# Patient Record
Sex: MALE | URBAN - METROPOLITAN AREA
[De-identification: names, ages, dates, MRNs, and addresses within clinical notes are randomized per-mention and may not be internally consistent; named-entity substitution may affect disease eponyms.]

---

## 2023-11-01 ENCOUNTER — APPOINTMENT (OUTPATIENT)
Dept: URBAN - METROPOLITAN AREA CLINIC 244 | Age: 33
Setting detail: DERMATOLOGY
End: 2023-11-01

## 2023-11-01 DIAGNOSIS — L30.8 OTHER SPECIFIED DERMATITIS: ICD-10-CM

## 2023-11-01 PROCEDURE — OTHER COUNSELING: OTHER

## 2023-11-01 PROCEDURE — 99203 OFFICE O/P NEW LOW 30 MIN: CPT

## 2023-11-01 PROCEDURE — OTHER PRESCRIPTION: OTHER

## 2023-11-01 RX ORDER — CLOBETASOL PROPIONATE 0.5 MG/G
CREAM TOPICAL BID
Qty: 60 | Refills: 1 | Status: ERX | COMMUNITY
Start: 2023-11-01

## 2023-11-01 ASSESSMENT — LOCATION DETAILED DESCRIPTION DERM: LOCATION DETAILED: LEFT DORSAL THUMB METACARPOPHALANGEAL JOINT

## 2023-11-01 ASSESSMENT — LOCATION SIMPLE DESCRIPTION DERM: LOCATION SIMPLE: LEFT HAND

## 2023-11-01 ASSESSMENT — LOCATION ZONE DERM: LOCATION ZONE: HAND

## 2024-07-12 ENCOUNTER — LAB ENCOUNTER (OUTPATIENT)
Dept: LAB | Facility: HOSPITAL | Age: 34
End: 2024-07-12
Attending: INTERNAL MEDICINE
Payer: COMMERCIAL

## 2024-07-12 ENCOUNTER — HOSPITAL ENCOUNTER (OUTPATIENT)
Dept: GENERAL RADIOLOGY | Facility: HOSPITAL | Age: 34
Discharge: HOME | End: 2024-07-12
Attending: INTERNAL MEDICINE
Payer: COMMERCIAL

## 2024-07-12 ENCOUNTER — OFFICE VISIT (OUTPATIENT)
Dept: RHEUMATOLOGY | Facility: CLINIC | Age: 34
End: 2024-07-12

## 2024-07-12 VITALS
BODY MASS INDEX: 25.11 KG/M2 | SYSTOLIC BLOOD PRESSURE: 105 MMHG | HEIGHT: 67 IN | HEART RATE: 76 BPM | WEIGHT: 160 LBS | DIASTOLIC BLOOD PRESSURE: 65 MMHG

## 2024-07-12 DIAGNOSIS — M25.50 POLYARTHRALGIA: Primary | ICD-10-CM

## 2024-07-12 DIAGNOSIS — M25.50 POLYARTHRALGIA: ICD-10-CM

## 2024-07-12 LAB
ALT SERPL-CCNC: 23 U/L
AST SERPL-CCNC: 23 U/L (ref ?–34)
C3 SERPL-MCNC: 135.5 MG/DL (ref 84–160)
C4 SERPL-MCNC: 29.4 MG/DL (ref 12–36)
CREAT BLD-MCNC: 0.84 MG/DL
EGFRCR SERPLBLD CKD-EPI 2021: 118 ML/MIN/1.73M2 (ref 60–?)
RHEUMATOID FACT SERPL-ACNC: 4 IU/ML (ref ?–14)

## 2024-07-12 PROCEDURE — 86431 RHEUMATOID FACTOR QUANT: CPT | Performed by: INTERNAL MEDICINE

## 2024-07-12 PROCEDURE — 86160 COMPLEMENT ANTIGEN: CPT

## 2024-07-12 PROCEDURE — 3074F SYST BP LT 130 MM HG: CPT | Performed by: INTERNAL MEDICINE

## 2024-07-12 PROCEDURE — 86200 CCP ANTIBODY: CPT | Performed by: INTERNAL MEDICINE

## 2024-07-12 PROCEDURE — 3078F DIAST BP <80 MM HG: CPT | Performed by: INTERNAL MEDICINE

## 2024-07-12 PROCEDURE — 85025 COMPLETE CBC W/AUTO DIFF WBC: CPT

## 2024-07-12 PROCEDURE — 73562 X-RAY EXAM OF KNEE 3: CPT | Performed by: INTERNAL MEDICINE

## 2024-07-12 PROCEDURE — 99204 OFFICE O/P NEW MOD 45 MIN: CPT | Performed by: INTERNAL MEDICINE

## 2024-07-12 PROCEDURE — 20610 DRAIN/INJ JOINT/BURSA W/O US: CPT | Performed by: INTERNAL MEDICINE

## 2024-07-12 PROCEDURE — 86038 ANTINUCLEAR ANTIBODIES: CPT | Performed by: INTERNAL MEDICINE

## 2024-07-12 PROCEDURE — 84460 ALANINE AMINO (ALT) (SGPT): CPT

## 2024-07-12 PROCEDURE — 85060 BLOOD SMEAR INTERPRETATION: CPT

## 2024-07-12 PROCEDURE — 36415 COLL VENOUS BLD VENIPUNCTURE: CPT

## 2024-07-12 PROCEDURE — 82565 ASSAY OF CREATININE: CPT

## 2024-07-12 PROCEDURE — 84450 TRANSFERASE (AST) (SGOT): CPT

## 2024-07-12 PROCEDURE — 3008F BODY MASS INDEX DOCD: CPT | Performed by: INTERNAL MEDICINE

## 2024-07-12 RX ORDER — TRIAMCINOLONE ACETONIDE 40 MG/ML
40 INJECTION, SUSPENSION INTRA-ARTICULAR; INTRAMUSCULAR ONCE
Status: COMPLETED | OUTPATIENT
Start: 2024-07-12 | End: 2024-07-12

## 2024-07-12 NOTE — PROGRESS NOTES
RHEUMATOLOGY CLINIC- NEW PATIENT    Kalin Nevarez is a 33 year old male.    ASSESSMENT/PLAN:       ICD-10-CM    1. Polyarthralgia  M25.50 triamcinolone acetonide (Kenalog-40) 40 MG/ML injection 40 mg     DRAIN/INJECT LARGE JOINT/BURSA     ALT(SGPT)     AST (SGOT)     CBC With Differential With Platelet     Creatinine, Serum     Cyclic Citrullinate Pep. IGG     Rheumatoid Arthritis Factor     Anti-Nuclear Antibody (GENIA) by IFA, Reflex Titer + Specific Antibodies     Complement C3, Serum     Complement C4, Serum     XR KNEE (3 VIEWS), LEFT (CPT=73562)     XR KNEE (3 VIEWS), RIGHT (CPT=73562)          DISCUSSION:  Patient presents as a new outpatient for evaluation of arthralgias that I suspect is more mechanical in etiology.  No synovitis on current exam.  Per patient request though, autoimmune workup ordered and I will MyChart patient regarding these results.  Will also check bilateral knee x-rays for independent review.  Right knee CSI performed in office today without complication and postprocedural instructions discussed.  Patient to contact office if he is interested in gel injections to the knee.  Lastly, patient requesting work accommodation letter which was printed for the patient today.    PLAN:  -Right knee CSI performed in office today without complication (procedure note below)  - Okay to continue as needed Tylenol for breakthrough pain  - Bilateral knee x-rays for independent review  - Patient to obtain the above nonfasting lab work  - Consult/evaluation communicated with referring physician/provider.    I will MyChart patient on receipt of above results.  Otherwise, patient to contact office and interested in gel knee injections.    Fatoumata Cantu DO  7/12/2024   2:34 PM  There is a longitudinal care relationship with me, the care plan reflects the ongoing nature of the continuous relationship of care, and the medical record indicates that there is ongoing treatment of a serious/complex medical condition  which I am currently managing.  is Applicable.         HPI:     Chief Complaint   Patient presents with    Consult     Knee pain, swelling in fingers, feels feels burning sensation in feet       I had the pleasure of seeing Kalin Nevarez on 7/12/2024 as a new outpatient consultation for polyarthralgias.     33 year old male w/ PMH vitamin D deficiency who presents to clinic today.  Patient reports a few months of polyarthralgias predominantly affecting his weightbearing joints including knees, feet, ankles.  Symptoms are worse with activity and after a busy day.  He has tried to treat his symptoms with Tylenol PM extra strength with only minimal relief of symptoms.  Also has occasional hand pain and stiffness.  Family history notable for mother with cutaneous psoriasis.  ROS otherwise negative for measured fevers, chills, unintentional weight loss, photosensitive rash, history of serositis, Raynaud's phenomenon.    Current Medications:  PRN Tylenol PM    Medication History:  N/A     Interval History:   See Above    HISTORY:  No past medical history on file.   Social Hx Reviewed   Family Hx Reviewed     Medications (Active prior to today's visit):  No current outpatient medications on file.       Allergies:  No Known Allergies      ROS:   Review of Systems   Constitutional:  Negative for chills and fever.   HENT:  Negative for congestion, hearing loss, mouth sores, nosebleeds and trouble swallowing.    Eyes:  Negative for photophobia, pain, redness and visual disturbance.   Respiratory:  Negative for cough and shortness of breath.    Cardiovascular:  Negative for chest pain, palpitations and leg swelling.   Gastrointestinal:  Negative for abdominal pain, blood in stool, diarrhea and nausea.   Endocrine: Negative for cold intolerance and heat intolerance.   Genitourinary:  Negative for dysuria, frequency and hematuria.   Musculoskeletal:  Positive for arthralgias, gait problem and joint swelling. Negative for  back pain, myalgias, neck pain and neck stiffness.   Skin:  Negative for color change and rash.   Neurological:  Negative for dizziness, weakness, numbness and headaches.   Psychiatric/Behavioral:  Negative for confusion and dysphoric mood.         PHYSICAL EXAM:     Constitutional:  Well developed, Well nourished, No acute distress  HENT:  Normocephalic, Atraumatic, Bilateral external ears normal, Oropharynx moist, No oral exudates.  Neck: Normal range of motion, No tenderness, Supple, No stridor.  Eyes:  PERRL, EOMI, Conjunctiva normal, No discharge.  Respiratory:  Normal breath sounds, No respiratory distress, No wheezing.  Cardiovascular:  Normal heart rate, Normal rhythm, No murmurs, No rubs, No gallops.  GI:  Bowel sounds normal, Soft, No tenderness, No masses, No pulsatile masses.  : No CVA tenderness.  Musculoskeletal:  A comprehensive 28 count joint exam was done and was negative for swelling or tenderness except as noted. Inspections for misalignment, asymmetry, crepitation, defects, tenderness, masses, nodules, effusions, range of motion, and stability in the upper and lower extremities bilaterally are all normal unless noted.           Integument:  Warm, Dry, No erythema, No rash.  Lymphatic:  No lymphadenopathy noted.  Neurologic:  Alert & oriented x 3, Normal motor function, Normal sensory function, No focal deficits noted.  Psychiatric:  Affect normal, Judgment normal, Mood normal.    LABS:     N/A    Imaging:     N/A

## 2024-07-13 LAB
BASOPHILS # BLD AUTO: 0.08 X10(3) UL (ref 0–0.2)
BASOPHILS NFR BLD AUTO: 0.7 %
DEPRECATED RDW RBC AUTO: 39.2 FL (ref 35.1–46.3)
EOSINOPHIL # BLD AUTO: 0.51 X10(3) UL (ref 0–0.7)
EOSINOPHIL NFR BLD AUTO: 4.7 %
ERYTHROCYTE [DISTWIDTH] IN BLOOD BY AUTOMATED COUNT: 14 % (ref 11–15)
HCT VFR BLD AUTO: 49.6 %
HGB BLD-MCNC: 15.8 G/DL
IMM GRANULOCYTES # BLD AUTO: 0.04 X10(3) UL (ref 0–1)
IMM GRANULOCYTES NFR BLD: 0.4 %
LYMPHOCYTES # BLD AUTO: 3.85 X10(3) UL (ref 1–4)
LYMPHOCYTES NFR BLD AUTO: 35.4 %
MCH RBC QN AUTO: 25 PG (ref 26–34)
MCHC RBC AUTO-ENTMCNC: 31.9 G/DL (ref 31–37)
MCV RBC AUTO: 78.5 FL
MONOCYTES # BLD AUTO: 0.73 X10(3) UL (ref 0.1–1)
MONOCYTES NFR BLD AUTO: 6.7 %
NEUTROPHILS # BLD AUTO: 5.66 X10 (3) UL (ref 1.5–7.7)
NEUTROPHILS # BLD AUTO: 5.66 X10(3) UL (ref 1.5–7.7)
NEUTROPHILS NFR BLD AUTO: 52.1 %
PLATELET # BLD AUTO: 358 10(3)UL (ref 150–450)
RBC # BLD AUTO: 6.32 X10(6)UL
WBC # BLD AUTO: 10.9 X10(3) UL (ref 4–11)

## 2024-07-16 ENCOUNTER — TELEPHONE (OUTPATIENT)
Dept: RHEUMATOLOGY | Facility: CLINIC | Age: 34
End: 2024-07-16

## 2024-07-16 LAB — NUCLEAR IGG TITR SER IF: NEGATIVE {TITER}

## 2024-07-16 NOTE — TELEPHONE ENCOUNTER
Patient is requesting another not for his job. His job thinks the note is fake. Patient has certain thing he would like note have. He would like to speak to someone regarding his request. Patient states \"this is an emergency\". Patient will be filing a complaint with his supervisor.Please advise.

## 2024-07-16 NOTE — TELEPHONE ENCOUNTER
Called patient 7/16 during AM.  Patient requesting further elaboration in his work letter.  Also requesting air conditioning a vehicle.

## 2024-07-18 LAB — CCP IGG SERPL-ACNC: 0.7 U/ML (ref 0–6.9)

## 2025-04-15 ENCOUNTER — TELEPHONE (OUTPATIENT)
Age: 35
End: 2025-04-15

## 2025-04-15 NOTE — TELEPHONE ENCOUNTER
Briana message sent:  Hello-I received a message regarding your joint pain.  I see that you are scheduled to see me 4/29 and we can take a closer look then.  If you like, we could trial a temporary steroid pack to see if this helps your symptoms.  Would keep in mind, steroids can cause: Difficulty sleeping (would take it in the morning), sugar problems, weight gain, fluid retention.  Please let me know and I can send it over to your listed pharmacy.  Thank you.

## 2025-04-15 NOTE — TELEPHONE ENCOUNTER
Patient, states that he has stiffness and inflammation in his fingers and palms. Patient stated that he also has pain on the right foot. Per the patient he took ibuprofen last night, but, still work up with pain.

## 2025-04-15 NOTE — TELEPHONE ENCOUNTER
Name and  verified. Pt reported swelling/pain in middle and index finger and swelling in palms. Right foot pain and swelling in 5th toe. He took over the counter ibuprofen 3 tablets last night, it did not help with the pain. Pt advised to schedule appointment for further evaluation. Pt agreeable and scheduled. Pt would like to know what to do for pain until appointment. Please advise.       Future Appointments   Date Time Provider Department Center   2025 11:40 AM Lehne, Ramier, DO [facility] [place]

## 2025-04-15 NOTE — TELEPHONE ENCOUNTER
If you have chest pain, increased lightheadedness, shortness of breath, increased abdominal pain   Noted.

## 2025-04-17 RX ORDER — PREDNISONE 5 MG/1
TABLET ORAL
Qty: 30 TABLET | Refills: 0 | Status: SHIPPED | OUTPATIENT
Start: 2025-04-17 | End: 2025-04-29

## 2025-04-29 ENCOUNTER — OFFICE VISIT (OUTPATIENT)
Dept: RHEUMATOLOGY | Facility: CLINIC | Age: 35
End: 2025-04-29

## 2025-04-29 VITALS
HEART RATE: 71 BPM | BODY MASS INDEX: 24.8 KG/M2 | SYSTOLIC BLOOD PRESSURE: 104 MMHG | WEIGHT: 158 LBS | DIASTOLIC BLOOD PRESSURE: 60 MMHG | HEIGHT: 67 IN

## 2025-04-29 DIAGNOSIS — M25.50 POLYARTHRALGIA: Primary | ICD-10-CM

## 2025-04-29 DIAGNOSIS — M17.0 OSTEOARTHRITIS OF BOTH KNEES, UNSPECIFIED OSTEOARTHRITIS TYPE: ICD-10-CM

## 2025-04-29 PROCEDURE — 3074F SYST BP LT 130 MM HG: CPT | Performed by: INTERNAL MEDICINE

## 2025-04-29 PROCEDURE — 3008F BODY MASS INDEX DOCD: CPT | Performed by: INTERNAL MEDICINE

## 2025-04-29 PROCEDURE — G2211 COMPLEX E/M VISIT ADD ON: HCPCS | Performed by: INTERNAL MEDICINE

## 2025-04-29 PROCEDURE — 99214 OFFICE O/P EST MOD 30 MIN: CPT | Performed by: INTERNAL MEDICINE

## 2025-04-29 PROCEDURE — 3078F DIAST BP <80 MM HG: CPT | Performed by: INTERNAL MEDICINE

## 2025-04-29 NOTE — PROGRESS NOTES
RHEUMATOLOGY CLINIC- FOLLOW UP    Kalin Nevarez is a 34 year old male.    ASSESSMENT/PLAN:       ICD-10-CM    1. Polyarthralgia  M25.50 XR HAND (MIN 3 VIEWS), LEFT (CPT=73130)     XR HAND (MIN 3 VIEWS), RIGHT (CPT=73130)     XR FOOT (2 VIEW), LEFT (CPT=73620)     XR FOOT (2 VIEW), RIGHT (CPT=73620)      2. Osteoarthritis of both knees, unspecified osteoarthritis type  M17.0 XR HAND (MIN 3 VIEWS), LEFT (CPT=73130)     XR HAND (MIN 3 VIEWS), RIGHT (CPT=73130)     XR FOOT (2 VIEW), LEFT (CPT=73620)     XR FOOT (2 VIEW), RIGHT (CPT=73620)          DISCUSSION:  Patient presents for f/u of polyarthralgias that I suspect is more mechanical in etiology.  No synovitis on current exam and prior imaging notable for bilateral knee OA which responded well to a CSI at his last appointment.  For now, discussed conservative management, as below.  Will check additional x-rays given his other arthralgias for independent review.    PLAN:  -- For pain relief, patient can take Tylenol-arthritis strength at 2 tablets every 8 hours as needed. May also apply topical Voltaren (diclofenac gel) to their affected areas of pain up to 4 times daily  - Will check x-rays for independent review  I will MyChart patient on receipt of above results.  Otherwise, patient to contact office if interested in repeat CSI versus gel injection    Fatoumata Cantu DO  4/29/2025   12:19 PM    There is a longitudinal care relationship with me, the care plan reflects the ongoing nature of the continuous relationship of care, and the medical record indicates that there is ongoing treatment of a serious/complex medical condition which I am currently managing.  is Applicable.         SUBJECTIVE:     4/29/25 Follow-Up: Responded well to right knee CSI performed at last appointment July 2025.  But has been working more recently and notes his joints have been acting up including his hands, feet especially.  Hands can be aggravated by activity like trying to lift  things or grab things.  Symptoms can be better with rest.  He started the prednisone taper which helped somewhat but still has 3 days left.    Current Medications:  PRN Tylenol PM    Medication History:  R Knee CSI 7/12/25    Prednisone taper x 12 days-minimal improvement     Interval History:     7/12/25 Initial Consult:  had the pleasure of seeing Kalin Nevarez on 7/12/2024 as a new outpatient consultation for polyarthralgias.     34 year old male w/ PMH vitamin D deficiency who presents to clinic today.  Patient reports a few months of polyarthralgias predominantly affecting his weightbearing joints including knees, feet, ankles.  Symptoms are worse with activity and after a busy day.  He has tried to treat his symptoms with Tylenol PM extra strength with only minimal relief of symptoms.  Also has occasional hand pain and stiffness.  Family history notable for mother with cutaneous psoriasis.  ROS otherwise negative for measured fevers, chills, unintentional weight loss, photosensitive rash, history of serositis, Raynaud's phenomenon.  HISTORY:  No past medical history on file.   Social Hx Reviewed   Family Hx Reviewed     Medications (Active prior to today's visit):  Current Outpatient Medications   Medication Sig Dispense Refill    predniSONE 5 MG Oral Tab Take 4 tablets (20 mg total) by mouth daily for 3 days, THEN 3 tablets (15 mg total) daily for 3 days, THEN 2 tablets (10 mg total) daily for 3 days, THEN 1 tablet (5 mg total) daily for 3 days. Take 4 tablets (20 mg total) by mouth every morning for 3 days, THEN 3 tablets (15 mg total) every morning for 3 days, THEN 2 tablets (10 mg total) every morning for 3 days. THEN 1 tablet (5 mg total) every morning for 3 days. 30 tablet 0       Allergies:  No Known Allergies      ROS:   Review of Systems   Constitutional:  Negative for chills and fever.   HENT:  Negative for congestion, hearing loss, mouth sores, nosebleeds and trouble swallowing.    Eyes:  Negative  for photophobia, pain, redness and visual disturbance.   Respiratory:  Negative for cough and shortness of breath.    Cardiovascular:  Negative for chest pain, palpitations and leg swelling.   Gastrointestinal:  Negative for abdominal pain, blood in stool, diarrhea and nausea.   Endocrine: Negative for cold intolerance and heat intolerance.   Genitourinary:  Negative for dysuria, frequency and hematuria.   Musculoskeletal:  Positive for arthralgias, gait problem and joint swelling. Negative for back pain, myalgias, neck pain and neck stiffness.   Skin:  Negative for color change and rash.   Neurological:  Negative for dizziness, weakness, numbness and headaches.   Psychiatric/Behavioral:  Negative for confusion and dysphoric mood.         PHYSICAL EXAM:     Constitutional:  Well developed, Well nourished, No acute distress  HENT:  Normocephalic, Atraumatic, Bilateral external ears normal, Oropharynx moist, No oral exudates.  Neck: Normal range of motion, No tenderness, Supple, No stridor.  Eyes:  PERRL, EOMI, Conjunctiva normal, No discharge.  Respiratory:  Normal breath sounds, No respiratory distress, No wheezing.  Cardiovascular:  Normal heart rate, Normal rhythm, No murmurs, No rubs, No gallops.  GI:  Bowel sounds normal, Soft, No tenderness, No masses, No pulsatile masses.  : No CVA tenderness.  Musculoskeletal:  A comprehensive 28 count joint exam was done and was negative for swelling or tenderness except as noted. Inspections for misalignment, asymmetry, crepitation, defects, tenderness, masses, nodules, effusions, range of motion, and stability in the upper and lower extremities bilaterally are all normal unless noted.           Integument:  Warm, Dry, No erythema, No rash.  Lymphatic:  No lymphadenopathy noted.  Neurologic:  Alert & oriented x 3, Normal motor function, Normal sensory function, No focal deficits noted.  Psychiatric:  Affect normal, Judgment normal, Mood normal.    LABS:     7/12/2024:  C3  135.5 WNL, C4 29.4 WNL  CR 0.84 WNL, AST 23 WNL, ALT 23 WNL  CBC with normal WBC, Hg 15.8 microcytic,  WNL    GENIA IFA negative  RF 4 WNL, CCP 0.7 WNL     Imagin/12/24 Bilateral Knee XR:        L Impression   CONCLUSION:     No acute fracture or dislocation.     Minimal tricompartmental osteoarthrosis.     Trace suprapatellar joint effusion.       R Impression   CONCLUSION:     No acute fracture or dislocation.     Minimal tricompartmental osteoarthrosis     Trace suprapatellar joint effusion.

## 2025-05-01 ENCOUNTER — HOSPITAL ENCOUNTER (OUTPATIENT)
Dept: GENERAL RADIOLOGY | Age: 35
Discharge: HOME | End: 2025-05-01
Attending: INTERNAL MEDICINE
Payer: COMMERCIAL

## 2025-05-01 DIAGNOSIS — M17.0 OSTEOARTHRITIS OF BOTH KNEES, UNSPECIFIED OSTEOARTHRITIS TYPE: ICD-10-CM

## 2025-05-01 DIAGNOSIS — M25.50 POLYARTHRALGIA: ICD-10-CM

## 2025-05-01 PROCEDURE — 73130 X-RAY EXAM OF HAND: CPT | Performed by: INTERNAL MEDICINE

## 2025-05-01 PROCEDURE — 73620 X-RAY EXAM OF FOOT: CPT | Performed by: INTERNAL MEDICINE

## (undated) NOTE — LETTER
7/16/2024          To Whom It May Concern:    Kalin Nevarez is currently under my care.  He has an underlying condition that can make it difficult to ambulate and can make certain ranges of motion difficult.  This patient would also benefit from an air conditioning-equipped vehicle.    If you require additional information please contact our office.        Sincerely,    Fatoumata Cantu DO          Document generated by:  Fatoumata Cantu DO

## (undated) NOTE — LETTER
7/12/2024              Kalin Nevarez        [address] [facility] [address] [facility] [address]         To Whom it may concern:    This is to certify that Kalin Nevarez had an appointment on 7/12/2024 with Fatoumata Cantu DO.  Patient is receiving treatment in our office.  He would benefit from an AC equipped vehicle at his workplace        Sincerely,    Fatoumata Cantu DO  [facility]  [address] [facility] [address] [phone]        Document electronically generated by:  Fatoumata Cantu DO